# Patient Record
Sex: MALE | Race: WHITE | Employment: FULL TIME | URBAN - METROPOLITAN AREA
[De-identification: names, ages, dates, MRNs, and addresses within clinical notes are randomized per-mention and may not be internally consistent; named-entity substitution may affect disease eponyms.]

---

## 2022-12-25 ENCOUNTER — HOSPITAL ENCOUNTER (EMERGENCY)
Age: 32
Discharge: HOME | End: 2022-12-25
Attending: EMERGENCY MEDICINE
Payer: COMMERCIAL

## 2022-12-25 VITALS
HEART RATE: 80 BPM | SYSTOLIC BLOOD PRESSURE: 137 MMHG | WEIGHT: 221.34 LBS | RESPIRATION RATE: 16 BRPM | OXYGEN SATURATION: 96 % | TEMPERATURE: 98.2 F | DIASTOLIC BLOOD PRESSURE: 95 MMHG | BODY MASS INDEX: 31.69 KG/M2 | HEIGHT: 70 IN

## 2022-12-25 DIAGNOSIS — T78.40XA ALLERGIC REACTION, INITIAL ENCOUNTER: Primary | ICD-10-CM

## 2022-12-25 PROCEDURE — 99283 EMERGENCY DEPT VISIT LOW MDM: CPT

## 2022-12-25 PROCEDURE — 74011636637 HC RX REV CODE- 636/637: Performed by: EMERGENCY MEDICINE

## 2022-12-25 RX ORDER — EPINEPHRINE 0.3 MG/.3ML
0.3 INJECTION SUBCUTANEOUS
Qty: 2 EACH | Refills: 0 | Status: SHIPPED | OUTPATIENT
Start: 2022-12-25 | End: 2022-12-25

## 2022-12-25 RX ADMIN — PREDNISONE 50 MG: 5 TABLET ORAL at 17:08

## 2022-12-25 NOTE — ED TRIAGE NOTES
Pt reports 30 minutes ago he ate a chex mix that he did not realize had nuts in it and he started to feel like he was having trouble swallowing and was short of breath. Pt reports he took 2 benadryl 15 minutes prior to coming to ED.

## 2022-12-25 NOTE — ED PROVIDER NOTES
[facility]   77-year-old man with a peanut allergy who presents to the emergency department due to concerns for an allergic reaction. Patient ate some Chex mix earlier tonight. He was unaware that this had peanuts in it. Shortly afterwards he felt like his tongue and throat was swelling. He took Benadryl and by the time he arrived to the emergency department his symptoms resolved. No abdominal pain. No rash. He says he has had abdominal pain and rash with allergic reactions before. No epinephrine administered. Currently is asymptomatic on my evaluation. History reviewed. No pertinent past medical history. History reviewed. No pertinent surgical history. History reviewed. No pertinent family history. Social History     Socioeconomic History    Marital status:      Spouse name: Not on file    Number of children: Not on file    Years of education: Not on file    Highest education level: Not on file   Occupational History    Not on file   Tobacco Use    Smoking status: Never    Smokeless tobacco: Never   Substance and Sexual Activity    Alcohol use:  Yes     Alcohol/week: 2.0 standard drinks     Types: 2 Drinks containing 0.5 oz of alcohol per week    Drug use: Never    Sexual activity: Not on file   Other Topics Concern    Not on file   Social History Narrative    Not on file     Social Determinants of Health     Financial Resource Strain: Not on file   Food Insecurity: Not on file   Transportation Needs: Not on file   Physical Activity: Not on file   Stress: Not on file   Social Connections: Not on file   Intimate Partner Violence: Not on file   Housing Stability: Not on file         ALLERGIES: Nut - unspecified and Nuts [tree nut]    Review of Systems  All systems reviewed were negative unless otherwise documented in the [facility]  Vitals:    12/25/22 1635 12/25/22 1645 12/25/22 1657   BP: (!) 174/104 (!) 136/94    Pulse: (!) 115  (!) 102   Resp: 16     Temp: 98.2 °F (36.8 °C)     SpO2: 98% 92% Weight: 100.4 kg (221 lb 5.5 oz)     Height: 5' 10\" (1.778 m)              Physical Exam  Constitutional:       Comments: Appears well. Not acutely distressed   HENT:      Mouth/Throat:      Comments: Moist mucous membranes. No oropharyngeal swelling. No tongue swelling. No uvular edema. No lip swelling. Phonating normally  Neck:      Comments: No stridor  Cardiovascular:      Comments: Regular rate and rhythm on exam.  No murmurs  Pulmonary:      Effort: Pulmonary effort is normal. No respiratory distress. Breath sounds: Normal breath sounds. No wheezing or rales. Abdominal:      General: There is no distension. Palpations: Abdomen is soft. Tenderness: There is no abdominal tenderness. Musculoskeletal:         General: No deformity. Normal range of motion. Cervical back: Normal range of motion. Skin:     General: Skin is warm and dry. Findings: No rash. Comments: No urticaria   Neurological:      Comments: Awake and alert. GCS 15        MDM  57-year-old man who presents with the above chief complaint. He is a bit hypertensive and tachycardic on arrival but his blood pressure improved without intervention. He has no signs of an allergic reaction on exam.  No wheezing. No oropharyngeal swelling. He was given some steroids and monitored with no recurrence of symptoms. He is appropriate for discharge. He understands reasons to return and was prescribed an EpiPen.   He was discharged in stable condition       Procedures

## 2022-12-25 NOTE — ED NOTES
Pt discharged in stable condition at this time. MD/ALESSANDRO reviewed discharge instructions, prescriptions, and follow up with patient at bedside. Pt verbalized understanding and denies any needs or questions at this time.    Pt NAD on DC ambulatory with his family who will drive him home

## 2022-12-25 NOTE — DISCHARGE INSTRUCTIONS
You can take Benadryl if you have recurrent symptoms but I recommend you return to the emergency department immediately. You should also return to the emergency department if you have to administer the EpiPen.